# Patient Record
Sex: FEMALE | Race: WHITE
[De-identification: names, ages, dates, MRNs, and addresses within clinical notes are randomized per-mention and may not be internally consistent; named-entity substitution may affect disease eponyms.]

---

## 2020-05-10 ENCOUNTER — HOSPITAL ENCOUNTER (EMERGENCY)
Dept: HOSPITAL 63 - ER | Age: 30
Discharge: LEFT BEFORE DISCHARGE | End: 2020-05-10
Payer: MEDICAID

## 2020-05-10 VITALS
WEIGHT: 220.46 LBS | HEIGHT: 64 IN | BODY MASS INDEX: 37.64 KG/M2 | DIASTOLIC BLOOD PRESSURE: 58 MMHG | SYSTOLIC BLOOD PRESSURE: 110 MMHG

## 2020-05-10 DIAGNOSIS — L53.8: ICD-10-CM

## 2020-05-10 DIAGNOSIS — T40.1X1A: Primary | ICD-10-CM

## 2020-05-10 DIAGNOSIS — F19.10: ICD-10-CM
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
## 2020-05-10 DIAGNOSIS — K02.9: ICD-10-CM

## 2020-05-10 DIAGNOSIS — Z72.0: ICD-10-CM

## 2020-05-10 PROCEDURE — 93005 ELECTROCARDIOGRAM TRACING: CPT

## 2020-05-10 PROCEDURE — 99283 EMERGENCY DEPT VISIT LOW MDM: CPT
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
## 2020-05-10 NOTE — EKG
[facility] [address], [place] [address]

Test Date:    2020-05-10               Test Time:    18:09:56

Pat Name:     ISIDORO GRAVES             Department:   

Patient ID:   [id number]           Room:          

Gender:       F                        Technician:   

:          [id number]               Requested By: SCHUYLER TORRES

Order Number: [id number]            Reading MD:   Сергей Chaves

                                 Measurements

Intervals                              Axis          

Rate:         113                      P:            71

GA:           158                      QRS:          79

QRSD:         94                       T:            47

QT:           332                                    

QTc:          461                                    

                           Interpretive Statements

SINUS TACHYCARDIA

LEFT ATRIAL ABNORMALITY



Electronically Signed On 2020 7:54:58 CDT by Сергей Chaves

## 2020-07-17 ENCOUNTER — HOSPITAL ENCOUNTER (EMERGENCY)
Dept: HOSPITAL 63 - ER | Age: 30
Discharge: HOME | End: 2020-07-17
Payer: MEDICAID

## 2020-07-17 VITALS
SYSTOLIC BLOOD PRESSURE: 132 MMHG | DIASTOLIC BLOOD PRESSURE: 93 MMHG | SYSTOLIC BLOOD PRESSURE: 132 MMHG | DIASTOLIC BLOOD PRESSURE: 93 MMHG | SYSTOLIC BLOOD PRESSURE: 132 MMHG | DIASTOLIC BLOOD PRESSURE: 93 MMHG

## 2020-07-17 VITALS — HEIGHT: 64 IN | WEIGHT: 129.41 LBS | BODY MASS INDEX: 22.09 KG/M2

## 2020-07-17 DIAGNOSIS — F15.10: ICD-10-CM
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
## 2020-07-17 DIAGNOSIS — F11.10: ICD-10-CM
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
## 2020-07-17 DIAGNOSIS — F10.20: ICD-10-CM

## 2020-07-17 DIAGNOSIS — S02.2XXA: Primary | ICD-10-CM

## 2020-07-17 DIAGNOSIS — S20.211A: ICD-10-CM
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
## 2020-07-17 DIAGNOSIS — K75.9: ICD-10-CM
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
## 2020-07-17 DIAGNOSIS — F17.210: ICD-10-CM

## 2020-07-17 LAB
ALBUMIN SERPL-MCNC: 3.5 G/DL (ref 3.4–5)
ALBUMIN/GLOB SERPL: 0.9 {RATIO} (ref 1–1.7)
ALP SERPL-CCNC: 423 U/L (ref 46–116)
ALT SERPL-CCNC: 1285 U/L (ref 14–59)
AMPHETAMINE/METHAMPHETAMINE: (no result)
ANION GAP SERPL CALC-SCNC: 11 MMOL/L (ref 6–14)
APTT PPP: YELLOW S
AST SERPL-CCNC: 902 U/L (ref 15–37)
BACTERIA #/AREA URNS HPF: 0 /HPF
BARBITURATES UR-MCNC: (no result) UG/ML
BASOPHILS # BLD AUTO: 0.1 X10^3/UL (ref 0–0.2)
BASOPHILS NFR BLD: 1 % (ref 0–3)
BENZODIAZ UR-MCNC: (no result) UG/L
BILIRUB SERPL-MCNC: 2.5 MG/DL (ref 0.2–1)
BILIRUB UR QL STRIP: (no result)
BUN/CREAT SERPL: 6 (ref 6–20)
CA-I SERPL ISE-MCNC: 5 MG/DL (ref 7–20)
CALCIUM SERPL-MCNC: 8.7 MG/DL (ref 8.5–10.1)
CANNABINOIDS UR-MCNC: (no result) UG/L
CHLORIDE SERPL-SCNC: 104 MMOL/L (ref 98–107)
CO2 SERPL-SCNC: 27 MMOL/L (ref 21–32)
COCAINE UR-MCNC: (no result) NG/ML
CREAT SERPL-MCNC: 0.8 MG/DL (ref 0.6–1)
EOSINOPHIL NFR BLD: 0 % (ref 0–3)
EOSINOPHIL NFR BLD: 0 X10^3/UL (ref 0–0.7)
ERYTHROCYTE [DISTWIDTH] IN BLOOD BY AUTOMATED COUNT: 16.9 % (ref 11.5–14.5)
FIBRINOGEN PPP-MCNC: CLEAR MG/DL
GFR SERPLBLD BASED ON 1.73 SQ M-ARVRAT: 83.1 ML/MIN
GLOBULIN SER-MCNC: 4 G/DL (ref 2.2–3.8)
GLUCOSE SERPL-MCNC: 103 MG/DL (ref 70–99)
GLUCOSE UR STRIP-MCNC: (no result) MG/DL
HCT VFR BLD CALC: 38.5 % (ref 36–47)
HGB BLD-MCNC: 12.5 G/DL (ref 12–15.5)
LYMPHOCYTES # BLD: 2.3 X10^3/UL (ref 1–4.8)
LYMPHOCYTES NFR BLD AUTO: 21 % (ref 24–48)
MCH RBC QN AUTO: 27 PG (ref 25–35)
MCHC RBC AUTO-ENTMCNC: 32 G/DL (ref 31–37)
MCV RBC AUTO: 82 FL (ref 79–100)
METHADONE SERPL-MCNC: (no result) NG/ML
MONO #: 1.2 X10^3/UL (ref 0–1.1)
MONOCYTES NFR BLD: 11 % (ref 0–9)
NEUT #: 7.2 X10^3UL (ref 1.8–7.7)
NEUTROPHILS NFR BLD AUTO: 67 % (ref 31–73)
NITRITE UR QL STRIP: (no result)
OPIATES UR-MCNC: (no result) NG/ML
PCP SERPL-MCNC: (no result) MG/DL
PLATELET # BLD AUTO: 291 X10^3/UL (ref 140–400)
POTASSIUM SERPL-SCNC: 3.1 MMOL/L (ref 3.5–5.1)
PROT SERPL-MCNC: 7.5 G/DL (ref 6.4–8.2)
RBC # BLD AUTO: 4.71 X10^6/UL (ref 3.5–5.4)
RBC #/AREA URNS HPF: 0 /HPF (ref 0–2)
SODIUM SERPL-SCNC: 142 MMOL/L (ref 136–145)
SP GR UR STRIP: <=1.005
SQUAMOUS #/AREA URNS LPF: (no result) /LPF
U PREG PATIENT: NEGATIVE
UROBILINOGEN UR-MCNC: 0.2 MG/DL
WBC # BLD AUTO: 10.8 X10^3/UL (ref 4–11)
WBC #/AREA URNS HPF: (no result) /HPF (ref 0–4)
YEAST #/AREA URNS HPF: PRESENT /HPF

## 2020-07-17 PROCEDURE — 87340 HEPATITIS B SURFACE AG IA: CPT

## 2020-07-17 PROCEDURE — 81001 URINALYSIS AUTO W/SCOPE: CPT

## 2020-07-17 PROCEDURE — 74177 CT ABD & PELVIS W/CONTRAST: CPT

## 2020-07-17 PROCEDURE — 85025 COMPLETE CBC W/AUTO DIFF WBC: CPT

## 2020-07-17 PROCEDURE — 86709 HEPATITIS A IGM ANTIBODY: CPT

## 2020-07-17 PROCEDURE — 36415 COLL VENOUS BLD VENIPUNCTURE: CPT

## 2020-07-17 PROCEDURE — 99285 EMERGENCY DEPT VISIT HI MDM: CPT

## 2020-07-17 PROCEDURE — 80307 DRUG TEST PRSMV CHEM ANLYZR: CPT

## 2020-07-17 PROCEDURE — 70486 CT MAXILLOFACIAL W/O DYE: CPT

## 2020-07-17 PROCEDURE — 70450 CT HEAD/BRAIN W/O DYE: CPT

## 2020-07-17 PROCEDURE — 71260 CT THORAX DX C+: CPT

## 2020-07-17 PROCEDURE — 96374 THER/PROPH/DIAG INJ IV PUSH: CPT

## 2020-07-17 PROCEDURE — 86803 HEPATITIS C AB TEST: CPT

## 2020-07-17 PROCEDURE — 80053 COMPREHEN METABOLIC PANEL: CPT

## 2020-07-17 PROCEDURE — 87086 URINE CULTURE/COLONY COUNT: CPT

## 2020-07-17 PROCEDURE — 81025 URINE PREGNANCY TEST: CPT

## 2020-07-17 PROCEDURE — 86705 HEP B CORE ANTIBODY IGM: CPT
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
## 2020-09-28 ENCOUNTER — HOSPITAL ENCOUNTER (EMERGENCY)
Dept: HOSPITAL 63 - ER | Age: 30
Discharge: LEFT BEFORE DISCHARGE | End: 2020-09-28
Payer: MEDICAID
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
## 2020-09-28 DIAGNOSIS — Z53.21: ICD-10-CM

## 2020-09-28 DIAGNOSIS — T14.8XXA: Primary | ICD-10-CM
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
## 2021-02-18 ENCOUNTER — HOSPITAL ENCOUNTER (EMERGENCY)
Dept: HOSPITAL 63 - ER | Age: 31
Discharge: HOME | End: 2021-02-18
Payer: MEDICAID

## 2021-02-18 VITALS — BODY MASS INDEX: 22.09 KG/M2 | WEIGHT: 129.41 LBS | HEIGHT: 64 IN

## 2021-02-18 DIAGNOSIS — Z98.51: ICD-10-CM

## 2021-02-18 DIAGNOSIS — M54.9: ICD-10-CM

## 2021-02-18 DIAGNOSIS — M62.838: Primary | ICD-10-CM

## 2021-02-18 DIAGNOSIS — Z98.890: ICD-10-CM

## 2021-02-18 DIAGNOSIS — F17.210: ICD-10-CM

## 2021-02-18 DIAGNOSIS — M79.7: ICD-10-CM

## 2021-02-18 LAB
ANION GAP SERPL CALC-SCNC: 6 MMOL/L (ref 6–14)
APTT PPP: (no result) S
BACTERIA #/AREA URNS HPF: (no result) /HPF
BILIRUB UR QL STRIP: (no result)
CA-I SERPL ISE-MCNC: 13 MG/DL (ref 7–20)
CALCIUM SERPL-MCNC: 8.7 MG/DL (ref 8.5–10.1)
CHLORIDE SERPL-SCNC: 99 MMOL/L (ref 98–107)
CO2 SERPL-SCNC: 30 MMOL/L (ref 21–32)
CREAT SERPL-MCNC: 0.8 MG/DL (ref 0.6–1)
FIBRINOGEN PPP-MCNC: (no result) MG/DL
GFR SERPLBLD BASED ON 1.73 SQ M-ARVRAT: 83.1 ML/MIN
GLUCOSE SERPL-MCNC: 120 MG/DL (ref 70–99)
GLUCOSE UR STRIP-MCNC: (no result) MG/DL
NITRITE UR QL STRIP: (no result)
POTASSIUM SERPL-SCNC: 3.9 MMOL/L (ref 3.5–5.1)
RBC #/AREA URNS HPF: (no result) /HPF (ref 0–2)
SODIUM SERPL-SCNC: 135 MMOL/L (ref 136–145)
SP GR UR STRIP: >=1.03
SQUAMOUS #/AREA URNS LPF: (no result) /LPF
UROBILINOGEN UR-MCNC: 2 MG/DL

## 2021-02-18 PROCEDURE — 99284 EMERGENCY DEPT VISIT MOD MDM: CPT

## 2021-02-18 PROCEDURE — 96372 THER/PROPH/DIAG INJ SC/IM: CPT

## 2021-02-18 PROCEDURE — 87086 URINE CULTURE/COLONY COUNT: CPT

## 2021-02-18 PROCEDURE — 36415 COLL VENOUS BLD VENIPUNCTURE: CPT

## 2021-02-18 PROCEDURE — 81001 URINALYSIS AUTO W/SCOPE: CPT

## 2021-02-18 PROCEDURE — 80048 BASIC METABOLIC PNL TOTAL CA: CPT

## 2021-02-18 NOTE — PHYS DOC
Past History


Past Medical History:  No Pertinent History, Alcoholism, Fibromyalgia, Other


Additional Past Medical Histor:  drug abuse


Past Surgical History:  No Surgical History, Tubal ligation, Other


Additional Past Surgical Histo:  oral


Smoking:  Cigarettes


Alcohol Use:  Heavy





General Adult


EDM:


Chief Complaint:  BACK PAIN OR INJURY





HPI:


HPI:





Patient is a 30-year-old female currently.  For referring worsening left back 

pain.  Patient states she is having intermittent muscle spasms in her left side.

 Says she woke up with the pain has been getting gradually worse.  Is taken 

Tylenol without improvement.  Says her primary care office.  Denies any injury, 

falls, or heavy lifting.  Denies any recent illness, fevers, cough, vomiting or 

diarrhea.  Patient denies any changes in urination or history of kidney stones. 

Denies a history of chronic back pain.





Review of Systems:


Review of Systems:


All other systems within normal limits except for as noted in the HPI





Current Medications:


Current Meds:





Current Medications








 Medications


  (Trade)  Dose


 Ordered  Sig/Anita  Start Time


 Stop Time Status Last Admin


Dose Admin


 


 Ketorolac


 Tromethamine


  (Toradol Im)  60 mg  1X  ONCE  2/18/21 08:30


 2/18/21 08:33 DC  





 


 Orphenadrine


 Citrate


  (Norflex)  60 mg  1X  ONCE  2/18/21 08:30


 2/18/21 08:33 DC  














Allergies:


Allergies:





Allergies








Coded Allergies Type Severity Reaction Last Updated Verified


 


  No Known Drug Allergies    7/17/20 No











Physical Exam:


PE:


Constitutional: Well developed, well nourished, no acute distress, non-toxic 

appearance. []


HENT: Normocephalic, atraumatic, bilateral external ears normal,  nose normal. 

[]


Eyes: PERRLA, conjunctiva normal, no discharge. [] 


Neck: No rigidity, supple, no stridor. [] 


Cardiovascular: Regular rate and rhythm, brisk cap refill []


Lungs & Thorax: Non labored symmetric respirations, no tachypnea or respiratory 

distress []


Abdomen: Soft, nondistended.


Skin: Warm, dry, no erythema, no rash. [] 


Back: No CVA tenderness, left upper back tenderness and muscle firmness over 

region of trapezius extending from the thoracic spine to base of neck.  No 

spinal deformity or point tenderness.


Extremities: No deformities, range of motion grossly intact, no lower extremity 

edema [] 


Neurologic: Alert and oriented X 3, no focal deficits noted. []


Psychologic: Affect normal, judgement normal, mood normal. []





Current Patient Data:


Vital Signs:





                                   Vital Signs








  Date Time  Temp Pulse Resp B/P (MAP) Pulse Ox O2 Delivery O2 Flow Rate FiO2


 


2/18/21 08:13 97.9 115 20 108/74 (85) 100 Room Air  











EKG:


EKG:


[]





Radiology/Procedures:


Radiology/Procedures:


[]





Heart Score:


Risk Factors:


Risk Factors:  DM, Current or recent (<one month) smoker, HTN, HLP, family 

history of CAD, obesity.


Risk Scores:


Score 0 - 3:  2.5% MACE over next 6 weeks - Discharge Home


Score 4 - 6:  20.3% MACE over next 6 weeks - Admit for Clinical Observation


Score 7 - 10:  72.7% MACE over next 6 weeks - Early Invasive Strategies





Course & Med Decision Making:


Course & Med Decision Making


Symptoms consistent with trapezius muscle spasm.





[]





Dragon Disclaimer:


Dragon Disclaimer:


This electronic medical record was generated, in whole or in part, using a voice

 recognition dictation system.





Departure


Departure:


Impression:  


   Primary Impression:  


   Trapezius muscle spasm


Disposition:  01 DC HOME SELF CARE/HOMELESS


Condition:  STABLE


Referrals:  


NED SAMUEL MD (PCP)


Patient Instructions:  Back Pain, Adult


Scripts


Cyclobenzaprine Hcl (CYCLOBENZAPRINE HCL) 5 Mg Tablet


1 TAB PO TID PRN PRN for MUSCLE SPASMS for 5 Days, #15 TAB


   Prov: DIANNA MCDOWELL MD         2/18/21 


Ibuprofen (IBUPROFEN) 800 Mg Tablet


1 TAB PO TID PRN for PAIN, #30 TAB


   Prov: DIANNA MCDOWELL MD         2/18/21











DIANNA MCDOWELL MD              Feb 18, 2021 08:42